# Patient Record
Sex: FEMALE | Race: WHITE | NOT HISPANIC OR LATINO | Employment: OTHER | URBAN - METROPOLITAN AREA
[De-identification: names, ages, dates, MRNs, and addresses within clinical notes are randomized per-mention and may not be internally consistent; named-entity substitution may affect disease eponyms.]

---

## 2019-12-21 ENCOUNTER — HOSPITAL ENCOUNTER (EMERGENCY)
Facility: HOSPITAL | Age: 84
Discharge: HOME | End: 2019-12-21
Attending: EMERGENCY MEDICINE | Admitting: EMERGENCY MEDICINE

## 2019-12-21 ENCOUNTER — APPOINTMENT (OUTPATIENT)
Dept: CT IMAGING | Facility: HOSPITAL | Age: 84
End: 2019-12-21

## 2019-12-21 ENCOUNTER — APPOINTMENT (OUTPATIENT)
Dept: GENERAL RADIOLOGY | Facility: HOSPITAL | Age: 84
End: 2019-12-21

## 2019-12-21 ENCOUNTER — HOSPITAL ENCOUNTER (OUTPATIENT)
Facility: HOSPITAL | Age: 84
Setting detail: OBSERVATION
Discharge: HOME HEALTH | End: 2019-12-23
Attending: EMERGENCY MEDICINE | Admitting: EMERGENCY MEDICINE

## 2019-12-21 VITALS
SYSTOLIC BLOOD PRESSURE: 160 MMHG | OXYGEN SATURATION: 98 % | HEART RATE: 62 BPM | HEIGHT: 62 IN | TEMPERATURE: 97.9 F | WEIGHT: 164.02 LBS | RESPIRATION RATE: 16 BRPM | BODY MASS INDEX: 30.18 KG/M2 | DIASTOLIC BLOOD PRESSURE: 58 MMHG

## 2019-12-21 DIAGNOSIS — I10 ESSENTIAL HYPERTENSION: ICD-10-CM

## 2019-12-21 DIAGNOSIS — R42 VERTIGO: ICD-10-CM

## 2019-12-21 DIAGNOSIS — I10 ESSENTIAL HYPERTENSION: Primary | ICD-10-CM

## 2019-12-21 DIAGNOSIS — R51.9 ACUTE NONINTRACTABLE HEADACHE, UNSPECIFIED HEADACHE TYPE: ICD-10-CM

## 2019-12-21 DIAGNOSIS — G44.89 OTHER HEADACHE SYNDROME: ICD-10-CM

## 2019-12-21 DIAGNOSIS — R11.2 NON-INTRACTABLE VOMITING WITH NAUSEA, UNSPECIFIED VOMITING TYPE: Primary | ICD-10-CM

## 2019-12-21 PROBLEM — R11.10 NON-INTRACTABLE VOMITING: Status: ACTIVE | Noted: 2019-12-21

## 2019-12-21 LAB
ANION GAP SERPL CALCULATED.3IONS-SCNC: 10 MMOL/L (ref 5–15)
BASOPHILS # BLD AUTO: 0.1 10*3/MM3 (ref 0–0.2)
BASOPHILS NFR BLD AUTO: 1.1 % (ref 0–1.5)
BUN BLD-MCNC: 23 MG/DL (ref 8–23)
BUN/CREAT SERPL: 22.8 (ref 7–25)
CALCIUM SPEC-SCNC: 10.7 MG/DL (ref 8.6–10.5)
CHLORIDE SERPL-SCNC: 104 MMOL/L (ref 98–107)
CO2 SERPL-SCNC: 29 MMOL/L (ref 22–29)
CREAT BLD-MCNC: 1.01 MG/DL (ref 0.57–1)
DEPRECATED RDW RBC AUTO: 42.9 FL (ref 37–54)
EOSINOPHIL # BLD AUTO: 0.1 10*3/MM3 (ref 0–0.4)
EOSINOPHIL NFR BLD AUTO: 1.3 % (ref 0.3–6.2)
ERYTHROCYTE [DISTWIDTH] IN BLOOD BY AUTOMATED COUNT: 13.5 % (ref 12.3–15.4)
GFR SERPL CREATININE-BSD FRML MDRD: 52 ML/MIN/1.73
GLUCOSE BLD-MCNC: 110 MG/DL (ref 65–99)
HCT VFR BLD AUTO: 34 % (ref 34–46.6)
HGB BLD-MCNC: 11.8 G/DL (ref 12–15.9)
LYMPHOCYTES # BLD AUTO: 1.9 10*3/MM3 (ref 0.7–3.1)
LYMPHOCYTES NFR BLD AUTO: 20.1 % (ref 19.6–45.3)
MCH RBC QN AUTO: 30.7 PG (ref 26.6–33)
MCHC RBC AUTO-ENTMCNC: 34.6 G/DL (ref 31.5–35.7)
MCV RBC AUTO: 88.8 FL (ref 79–97)
MONOCYTES # BLD AUTO: 0.7 10*3/MM3 (ref 0.1–0.9)
MONOCYTES NFR BLD AUTO: 7.6 % (ref 5–12)
NEUTROPHILS # BLD AUTO: 6.5 10*3/MM3 (ref 1.7–7)
NEUTROPHILS NFR BLD AUTO: 69.9 % (ref 42.7–76)
NRBC BLD AUTO-RTO: 0.1 /100 WBC (ref 0–0.2)
PLATELET # BLD AUTO: 277 10*3/MM3 (ref 140–450)
PMV BLD AUTO: 7.8 FL (ref 6–12)
POTASSIUM BLD-SCNC: 4.3 MMOL/L (ref 3.5–5.2)
RBC # BLD AUTO: 3.83 10*6/MM3 (ref 3.77–5.28)
SODIUM BLD-SCNC: 143 MMOL/L (ref 136–145)
TROPONIN T SERPL-MCNC: <0.01 NG/ML (ref 0–0.03)
WBC NRBC COR # BLD: 9.4 10*3/MM3 (ref 3.4–10.8)

## 2019-12-21 PROCEDURE — G0378 HOSPITAL OBSERVATION PER HR: HCPCS

## 2019-12-21 PROCEDURE — 25010000002 HYDRALAZINE PER 20 MG: Performed by: EMERGENCY MEDICINE

## 2019-12-21 PROCEDURE — 99285 EMERGENCY DEPT VISIT HI MDM: CPT

## 2019-12-21 PROCEDURE — 96375 TX/PRO/DX INJ NEW DRUG ADDON: CPT

## 2019-12-21 PROCEDURE — 96372 THER/PROPH/DIAG INJ SC/IM: CPT

## 2019-12-21 PROCEDURE — 70450 CT HEAD/BRAIN W/O DYE: CPT

## 2019-12-21 PROCEDURE — 25010000002 HYDROMORPHONE PER 4 MG: Performed by: EMERGENCY MEDICINE

## 2019-12-21 PROCEDURE — 93005 ELECTROCARDIOGRAM TRACING: CPT | Performed by: EMERGENCY MEDICINE

## 2019-12-21 PROCEDURE — 25010000002 MORPHINE PER 10 MG: Performed by: EMERGENCY MEDICINE

## 2019-12-21 PROCEDURE — 96374 THER/PROPH/DIAG INJ IV PUSH: CPT

## 2019-12-21 PROCEDURE — 25010000002 DIPHENHYDRAMINE PER 50 MG: Performed by: EMERGENCY MEDICINE

## 2019-12-21 PROCEDURE — 99219 PR INITIAL OBSERVATION CARE/DAY 50 MINUTES: CPT | Performed by: NURSE PRACTITIONER

## 2019-12-21 PROCEDURE — 84484 ASSAY OF TROPONIN QUANT: CPT | Performed by: EMERGENCY MEDICINE

## 2019-12-21 PROCEDURE — 80048 BASIC METABOLIC PNL TOTAL CA: CPT | Performed by: EMERGENCY MEDICINE

## 2019-12-21 PROCEDURE — 25010000002 ONDANSETRON PER 1 MG: Performed by: EMERGENCY MEDICINE

## 2019-12-21 PROCEDURE — 25010000002 ENOXAPARIN PER 10 MG: Performed by: NURSE PRACTITIONER

## 2019-12-21 PROCEDURE — 99284 EMERGENCY DEPT VISIT MOD MDM: CPT

## 2019-12-21 PROCEDURE — 85025 COMPLETE CBC W/AUTO DIFF WBC: CPT | Performed by: EMERGENCY MEDICINE

## 2019-12-21 PROCEDURE — 71045 X-RAY EXAM CHEST 1 VIEW: CPT

## 2019-12-21 RX ORDER — SODIUM CHLORIDE AND POTASSIUM CHLORIDE 150; 900 MG/100ML; MG/100ML
100 INJECTION, SOLUTION INTRAVENOUS CONTINUOUS
Status: DISCONTINUED | OUTPATIENT
Start: 2019-12-21 | End: 2019-12-23 | Stop reason: HOSPADM

## 2019-12-21 RX ORDER — ACETAMINOPHEN 650 MG/1
650 SUPPOSITORY RECTAL EVERY 4 HOURS PRN
Status: DISCONTINUED | OUTPATIENT
Start: 2019-12-21 | End: 2019-12-23 | Stop reason: HOSPADM

## 2019-12-21 RX ORDER — HYDRALAZINE HYDROCHLORIDE 20 MG/ML
10 INJECTION INTRAMUSCULAR; INTRAVENOUS ONCE
Status: COMPLETED | OUTPATIENT
Start: 2019-12-21 | End: 2019-12-21

## 2019-12-21 RX ORDER — ACETAMINOPHEN 160 MG/5ML
650 SOLUTION ORAL EVERY 4 HOURS PRN
Status: DISCONTINUED | OUTPATIENT
Start: 2019-12-21 | End: 2019-12-23 | Stop reason: HOSPADM

## 2019-12-21 RX ORDER — HYDROMORPHONE HCL 110MG/55ML
0.5 PATIENT CONTROLLED ANALGESIA SYRINGE INTRAVENOUS ONCE
Status: COMPLETED | OUTPATIENT
Start: 2019-12-21 | End: 2019-12-21

## 2019-12-21 RX ORDER — LANOLIN ALCOHOL/MO/W.PET/CERES
1000 CREAM (GRAM) TOPICAL DAILY
COMMUNITY

## 2019-12-21 RX ORDER — ONDANSETRON 4 MG/1
4 TABLET, FILM COATED ORAL EVERY 6 HOURS PRN
Status: DISCONTINUED | OUTPATIENT
Start: 2019-12-21 | End: 2019-12-23 | Stop reason: HOSPADM

## 2019-12-21 RX ORDER — ONDANSETRON 2 MG/ML
4 INJECTION INTRAMUSCULAR; INTRAVENOUS EVERY 6 HOURS PRN
Status: DISCONTINUED | OUTPATIENT
Start: 2019-12-21 | End: 2019-12-23 | Stop reason: HOSPADM

## 2019-12-21 RX ORDER — MULTIPLE VITAMINS W/ MINERALS TAB 9MG-400MCG
1 TAB ORAL DAILY
COMMUNITY

## 2019-12-21 RX ORDER — METOPROLOL TARTRATE 100 MG/1
100 TABLET ORAL DAILY
COMMUNITY
End: 2019-12-21

## 2019-12-21 RX ORDER — MECLIZINE HYDROCHLORIDE 25 MG/1
25 TABLET ORAL 4 TIMES DAILY PRN
Qty: 16 TABLET | Refills: 0 | Status: SHIPPED | OUTPATIENT
Start: 2019-12-21 | End: 2020-01-06

## 2019-12-21 RX ORDER — ONDANSETRON 2 MG/ML
4 INJECTION INTRAMUSCULAR; INTRAVENOUS ONCE
Status: COMPLETED | OUTPATIENT
Start: 2019-12-21 | End: 2019-12-21

## 2019-12-21 RX ORDER — ACETAMINOPHEN 325 MG/1
650 TABLET ORAL EVERY 4 HOURS PRN
Status: DISCONTINUED | OUTPATIENT
Start: 2019-12-21 | End: 2019-12-23 | Stop reason: HOSPADM

## 2019-12-21 RX ORDER — MECLIZINE HYDROCHLORIDE 25 MG/1
25 TABLET ORAL ONCE
Status: COMPLETED | OUTPATIENT
Start: 2019-12-21 | End: 2019-12-21

## 2019-12-21 RX ORDER — HYDRALAZINE HYDROCHLORIDE 25 MG/1
25 TABLET, FILM COATED ORAL 2 TIMES DAILY
COMMUNITY

## 2019-12-21 RX ORDER — DIPHENHYDRAMINE HYDROCHLORIDE 50 MG/ML
25 INJECTION INTRAMUSCULAR; INTRAVENOUS ONCE
Status: COMPLETED | OUTPATIENT
Start: 2019-12-21 | End: 2019-12-21

## 2019-12-21 RX ORDER — SODIUM CHLORIDE 0.9 % (FLUSH) 0.9 %
10 SYRINGE (ML) INJECTION AS NEEDED
Status: DISCONTINUED | OUTPATIENT
Start: 2019-12-21 | End: 2019-12-23 | Stop reason: HOSPADM

## 2019-12-21 RX ORDER — MORPHINE SULFATE 4 MG/ML
4 INJECTION, SOLUTION INTRAMUSCULAR; INTRAVENOUS ONCE
Status: COMPLETED | OUTPATIENT
Start: 2019-12-21 | End: 2019-12-21

## 2019-12-21 RX ORDER — SODIUM CHLORIDE 0.9 % (FLUSH) 0.9 %
10 SYRINGE (ML) INJECTION AS NEEDED
Status: DISCONTINUED | OUTPATIENT
Start: 2019-12-21 | End: 2019-12-21 | Stop reason: HOSPADM

## 2019-12-21 RX ORDER — SODIUM CHLORIDE 0.9 % (FLUSH) 0.9 %
10 SYRINGE (ML) INJECTION EVERY 12 HOURS SCHEDULED
Status: DISCONTINUED | OUTPATIENT
Start: 2019-12-21 | End: 2019-12-23 | Stop reason: HOSPADM

## 2019-12-21 RX ORDER — FUROSEMIDE 20 MG/1
20 TABLET ORAL DAILY
COMMUNITY

## 2019-12-21 RX ADMIN — ONDANSETRON 4 MG: 2 INJECTION INTRAMUSCULAR; INTRAVENOUS at 13:27

## 2019-12-21 RX ADMIN — SODIUM CHLORIDE 500 ML: 900 INJECTION, SOLUTION INTRAVENOUS at 16:31

## 2019-12-21 RX ADMIN — HYDROMORPHONE HYDROCHLORIDE 0.5 MG: 2 INJECTION, SOLUTION INTRAMUSCULAR; INTRAVENOUS; SUBCUTANEOUS at 13:33

## 2019-12-21 RX ADMIN — HYDRALAZINE HYDROCHLORIDE 10 MG: 20 INJECTION INTRAMUSCULAR; INTRAVENOUS at 13:30

## 2019-12-21 RX ADMIN — Medication 10 ML: at 23:27

## 2019-12-21 RX ADMIN — ENOXAPARIN SODIUM 40 MG: 40 INJECTION SUBCUTANEOUS at 23:27

## 2019-12-21 RX ADMIN — MORPHINE SULFATE 4 MG: 4 INJECTION INTRAVENOUS at 14:43

## 2019-12-21 RX ADMIN — MECLIZINE HYDROCHLORIDE 25 MG: 25 TABLET ORAL at 16:05

## 2019-12-21 RX ADMIN — DIPHENHYDRAMINE HYDROCHLORIDE 25 MG: 50 INJECTION, SOLUTION INTRAMUSCULAR; INTRAVENOUS at 13:29

## 2019-12-21 NOTE — ED NOTES
Pt reports hypertension higher than normal.  She has history of hypertension and reports taking blood pressure medication daily.  Her Dr. Changed her medication this week.     Eliz Galan, RN  12/21/19 9311

## 2019-12-21 NOTE — DISCHARGE INSTRUCTIONS
Continue current medications and also start taking your hydralazine.  Follow-up with Dr. Zelaya in the coming week for a blood pressure recheck.  Meclizine for dizziness.

## 2019-12-21 NOTE — ED NOTES
Pt reports no dizziness.  She is able to comfortably sit and stand on her own.     Eliz Galan, RN  12/21/19 8868

## 2019-12-21 NOTE — ED PROVIDER NOTES
Subjective   History of Present Illness  Patient with a history of hypertension has been on metoprolol and Lasix for some time.  She complains of increasing blood pressure recently along with a headache as well as lightheadedness and states that she also has pain in the lower back which is chronic.  The patient denies any nausea vomiting change in vision or chest pain.  Her doctor added hydralazine yesterday but she states that she was afraid to take it and noticed that her blood pressure was over 200 this morning and came to the emergency department.  The patient denies any fever chills cough or congestion vomiting or diarrhea.  Review of Systems   Constitutional: Positive for fatigue.   HENT: Negative.    Eyes: Negative.    Respiratory: Negative.    Cardiovascular: Positive for leg swelling.   Gastrointestinal: Positive for nausea.   Endocrine: Negative.    Genitourinary: Negative.    Musculoskeletal: Positive for back pain.   Skin: Negative.    Allergic/Immunologic: Negative.    Neurological: Positive for weakness and light-headedness.   Hematological: Negative.    Psychiatric/Behavioral: Negative.        No past medical history on file.    Allergies   Allergen Reactions   • Codeine Hives       No past surgical history on file.    No family history on file.    Social History     Socioeconomic History   • Marital status:      Spouse name: Not on file   • Number of children: Not on file   • Years of education: Not on file   • Highest education level: Not on file           Objective   Physical Exam  Patient is awake and alert her blood pressure was elevated to 20/77 and heart rate was 91.  The HEENT exam shows no evidence of any trauma or tenderness pupils equal round reactive to light EOMs are full ENT is unremarkable neck is supple without meningeal signs her chest is clear cardiovascular exam reveals a regular rhythm without a gallop or murmur the abdomen is soft and nontender the patient has trace pedal  edema she has good range of motion of the extremities no focal deficit was noted.  Procedures           ED Course            Results for orders placed or performed during the hospital encounter of 12/21/19   Basic Metabolic Panel   Result Value Ref Range    Glucose 110 (H) 65 - 99 mg/dL    BUN 23 8 - 23 mg/dL    Creatinine 1.01 (H) 0.57 - 1.00 mg/dL    Sodium 143 136 - 145 mmol/L    Potassium 4.3 3.5 - 5.2 mmol/L    Chloride 104 98 - 107 mmol/L    CO2 29.0 22.0 - 29.0 mmol/L    Calcium 10.7 (H) 8.6 - 10.5 mg/dL    eGFR Non African Amer 52 (L) >60 mL/min/1.73    BUN/Creatinine Ratio 22.8 7.0 - 25.0    Anion Gap 10.0 5.0 - 15.0 mmol/L   Troponin   Result Value Ref Range    Troponin T <0.010 0.000 - 0.030 ng/mL   CBC Auto Differential   Result Value Ref Range    WBC 9.40 3.40 - 10.80 10*3/mm3    RBC 3.83 3.77 - 5.28 10*6/mm3    Hemoglobin 11.8 (L) 12.0 - 15.9 g/dL    Hematocrit 34.0 34.0 - 46.6 %    MCV 88.8 79.0 - 97.0 fL    MCH 30.7 26.6 - 33.0 pg    MCHC 34.6 31.5 - 35.7 g/dL    RDW 13.5 12.3 - 15.4 %    RDW-SD 42.9 37.0 - 54.0 fl    MPV 7.8 6.0 - 12.0 fL    Platelets 277 140 - 450 10*3/mm3    Neutrophil % 69.9 42.7 - 76.0 %    Lymphocyte % 20.1 19.6 - 45.3 %    Monocyte % 7.6 5.0 - 12.0 %    Eosinophil % 1.3 0.3 - 6.2 %    Basophil % 1.1 0.0 - 1.5 %    Neutrophils, Absolute 6.50 1.70 - 7.00 10*3/mm3    Lymphocytes, Absolute 1.90 0.70 - 3.10 10*3/mm3    Monocytes, Absolute 0.70 0.10 - 0.90 10*3/mm3    Eosinophils, Absolute 0.10 0.00 - 0.40 10*3/mm3    Basophils, Absolute 0.10 0.00 - 0.20 10*3/mm3    nRBC 0.1 0.0 - 0.2 /100 WBC     Medications   sodium chloride 0.9 % flush 10 mL (has no administration in time range)   HYDROmorphone (DILAUDID) injection 0.5 mg (0.5 mg Intravenous Given 12/21/19 1333)   ondansetron (ZOFRAN) injection 4 mg (4 mg Intravenous Given 12/21/19 1327)   hydrALAZINE (APRESOLINE) injection 10 mg (10 mg Intravenous Given 12/21/19 1330)   diphenhydrAMINE (BENADRYL) injection 25 mg (25 mg  Intravenous Given 12/21/19 1329)   Morphine sulfate (PF) injection 4 mg (4 mg Intravenous Given 12/21/19 1443)     Ct Head Without Contrast    Result Date: 12/21/2019  No evidence of hemorrhage, mass effect or midline shift. No acute process identified.  Electronically Signed By-Kaylyn Helton On:12/21/2019 2:42 PM This report was finalized on [id number] by  Kaylyn Helton, .    Xr Chest 1 View    Result Date: 12/21/2019  Cardiomegaly without acute cardiopulmonary abnormality.  Electronically Signed By-Feng Paige On:12/21/2019 3:22 PM This report was finalized on [id number] by  Feng Paige, .                 No data recorded                        MDM  The patient's blood pressure decreased to an acceptable range after IV hydralazine.  The patient had resolution of her headache also.  The patient has a prescription for hydralazine that she has not started and she is encouraged to start taking that tonight.  Final diagnoses:   Essential hypertension   Other headache syndrome              Manuel Varela MD  12/21/19 4902

## 2019-12-22 PROCEDURE — 25010000002 ENOXAPARIN PER 10 MG: Performed by: NURSE PRACTITIONER

## 2019-12-22 PROCEDURE — 96361 HYDRATE IV INFUSION ADD-ON: CPT

## 2019-12-22 PROCEDURE — 99225 PR SBSQ OBSERVATION CARE/DAY 25 MINUTES: CPT | Performed by: INTERNAL MEDICINE

## 2019-12-22 PROCEDURE — 25810000003 SODIUM CHLORIDE 0.9 % WITH KCL 20 MEQ 20-0.9 MEQ/L-% SOLUTION: Performed by: NURSE PRACTITIONER

## 2019-12-22 PROCEDURE — G0378 HOSPITAL OBSERVATION PER HR: HCPCS

## 2019-12-22 PROCEDURE — 96372 THER/PROPH/DIAG INJ SC/IM: CPT

## 2019-12-22 RX ADMIN — SODIUM CHLORIDE AND POTASSIUM CHLORIDE 100 ML/HR: 9; 1.49 INJECTION, SOLUTION INTRAVENOUS at 09:42

## 2019-12-22 RX ADMIN — Medication 10 ML: at 08:19

## 2019-12-22 RX ADMIN — SODIUM CHLORIDE AND POTASSIUM CHLORIDE 100 ML/HR: 9; 1.49 INJECTION, SOLUTION INTRAVENOUS at 19:17

## 2019-12-22 RX ADMIN — SODIUM CHLORIDE AND POTASSIUM CHLORIDE 100 ML/HR: 9; 1.49 INJECTION, SOLUTION INTRAVENOUS at 00:06

## 2019-12-22 RX ADMIN — ENOXAPARIN SODIUM 40 MG: 40 INJECTION SUBCUTANEOUS at 18:01

## 2019-12-22 NOTE — PLAN OF CARE
Problem: Patient Care Overview  Goal: Plan of Care Review  12/22/2019 0436 by Treva Bah LPN  Outcome: Ongoing (interventions implemented as appropriate)  12/22/2019 0436 by Treva Bah LPN  Outcome: Ongoing (interventions implemented as appropriate)  Flowsheets (Taken 12/22/2019 0436)  Plan of Care Reviewed With: patient

## 2019-12-22 NOTE — ED PROVIDER NOTES
Subjective   History of Present Illness  85-year-old female with a history of hypertension is normally on metoprolol but her blood pressure has been elevated recently.  She saw her doctor 2 days ago who prescribed her hydralazine but she read the side effects and did not want to take it.  Her blood pressure was elevated today and she felt dizzy.  Patient had a headache.  Patient denies any vomiting.  Denies any chest pain shortness of breath numbness or tingling.  The patient was just seen in the emergency department about 1/2-hour ago for hypertension was treated with hydralazine and her pressure was under control.  She also complained of some dizziness and was given some meclizine.  She states that she went home and tried to take her medicine and immediately vomited and return to the ER.  Review of Systems   Constitutional: Positive for fatigue.   Respiratory: Negative.    Cardiovascular: Negative.    Gastrointestinal: Positive for nausea and vomiting.   Endocrine: Negative.    Genitourinary: Negative.    Musculoskeletal: Positive for myalgias.   Skin: Negative.    Allergic/Immunologic: Negative.    Neurological: Positive for dizziness and headaches.   Hematological: Negative.    Psychiatric/Behavioral: Negative.        Past Medical History:   Diagnosis Date   • Hypertension        Allergies   Allergen Reactions   • Codeine Hives       Past Surgical History:   Procedure Laterality Date   • HYSTERECTOMY     • REPLACEMENT TOTAL HIP LATERAL POSITION         History reviewed. No pertinent family history.    Social History     Socioeconomic History   • Marital status:      Spouse name: Not on file   • Number of children: Not on file   • Years of education: Not on file   • Highest education level: Not on file   Tobacco Use   • Smoking status: Never Smoker   • Smokeless tobacco: Never Used   Substance and Sexual Activity   • Alcohol use: Never     Frequency: Never   • Drug use: Never           Objective   Physical  Exam  Patient is awake and alert the blood pressure was 159/65 the temp was 97.6 and a heart rate was 67.  The HEENT exam shows no evidence of any trauma or tenderness pupils equal round reactive to light EOMs are full there is no nystagmus ENT is clear neck is supple carotids 2+ no bruits are heard chest is clear cardiovascular exam reveals a regular rhythm her abdomen was soft she has no cyanosis clubbing or edema neurologic exam reveals her to be vertiginous but I do not appreciate any nystagmus associated with that.  Procedures           ED Course                      No data recorded                        MDM  The patient had lab work reviewed and there was no significant abnormality.  Her blood pressure was stable.  The patient had significant vertigo as well as nausea.  She had an IV begun and was given Zofran for nausea and will be admitted overnight for IV fluids as well as anti-medics as well as blood pressure control as indicated.  Final diagnoses:   Non-intractable vomiting with nausea, unspecified vomiting type   Essential hypertension   Vertigo   Acute nonintractable headache, unspecified headache type              Manuel Varela MD  12/21/19 2036     Yes

## 2019-12-22 NOTE — PLAN OF CARE
Problem: Patient Care Overview  Goal: Plan of Care Review  Outcome: Ongoing (interventions implemented as appropriate)  Flowsheets (Taken 12/22/2019 3677)  Plan of Care Reviewed With: patient

## 2019-12-22 NOTE — ED NOTES
Pt states that she was seen earlier today for dizziness. States that she was discharged and has been unable to take the medications due to nausea.     Sugar Garcia RN  12/21/19 1957

## 2019-12-22 NOTE — PLAN OF CARE
Problem: Patient Care Overview  Goal: Plan of Care Review  12/22/2019 0437 by Treva Bah LPN  Outcome: Ongoing (interventions implemented as appropriate)  Flowsheets (Taken 12/22/2019 0437)  Plan of Care Reviewed With: patient  12/22/2019 0436 by Treva Bah LPN  Outcome: Ongoing (interventions implemented as appropriate)  12/22/2019 0436 by Treva Bah LPN  Outcome: Ongoing (interventions implemented as appropriate)  Flowsheets (Taken 12/22/2019 0436)  Plan of Care Reviewed With: patient

## 2019-12-22 NOTE — H&P
[facility] Medicine Services      Patient Name: Madison Khan  : 1934  MRN: [id number]  Primary Care Physician: Darryn Giron MD  Date of admission: 2019    Patient Care Team:  Darryn Giron MD as PCP - General          Subjective   History Present Illness     Chief Complaint:   Chief Complaint   Patient presents with   • Dizziness       Ms. Khan is a 85 y.o.  presents to [facility] complaining of nausea and vomiting.         85-year-old female presents to the ER after having an episode of significant nausea and vomiting when returning home after being seen in the emergency room for hypertension.  In the emergency room the patient had been given hydralazine IV for elevated blood pressure.  She had seen her PCP earlier in the week with noted elevated blood pressure and was prescribed p.o. hydralazine.  She had not taken the medication because of concern for side effects.  Patient has long standing hypertension which is been well controlled on Lasix until just recently.  The patient denies recent subjective fever or chills.      Review of Systems   Constitution: Negative for chills and fever.   Cardiovascular: Negative for chest pain.   Gastrointestinal: Positive for nausea and vomiting.   All other systems reviewed and are negative.          Personal History     Past Medical History:   Past Medical History:   Diagnosis Date   • Hypertension        Surgical History:      Past Surgical History:   Procedure Laterality Date   • HYSTERECTOMY     • REPLACEMENT TOTAL HIP LATERAL POSITION             Family History: family history is not on file. Otherwise pertinent FHx was reviewed and unremarkable.     Social History:  reports that she has never smoked. She has never used smokeless tobacco. She reports that she does not drink alcohol or use drugs.      Medications:  Prior to Admission medications    Medication Sig Start Date End Date Taking? Authorizing  Provider   Calcium-Magnesium-Vitamin D (CALCIUM 500 PO) Take 500 mg by mouth Daily.   Yes Ryan Delatorre MD   furosemide (LASIX) 20 MG tablet Take 20 mg by mouth Daily.   Yes Ryan Delatorre MD   hydrALAZINE (APRESOLINE) 25 MG tablet Take 25 mg by mouth 2 (Two) Times a Day.   Yes Ryan Delatorre MD   meclizine (ANTIVERT) 25 MG tablet Take 1 tablet by mouth 4 (Four) Times a Day As Needed for Dizziness for up to 16 days. 12/21/19 1/6/20 Yes Manuel Varela MD   Multiple Vitamins-Minerals (MULTIVITAMIN WITH MINERALS) tablet tablet Take 1 tablet by mouth Daily.   Yes Ryan Delatorre MD   vitamin B-12 (CYANOCOBALAMIN) 1000 MCG tablet Take 1,000 mcg by mouth Daily.   Yes Ryan Delatorre MD   metoprolol tartrate (LOPRESSOR) 100 MG tablet Take 100 mg by mouth Daily.  12/21/19  Ryan Delatorre MD       Allergies:    Allergies   Allergen Reactions   • Codeine Hives       Objective   Objective     Vital Signs  Temp:  [97.6 °F (36.4 °C)-97.9 °F (36.6 °C)] 97.6 °F (36.4 °C)  Heart Rate:  [56-91] 57  Resp:  [14-16] 16  BP: (138-220)/() 144/50  SpO2:  [95 %-99 %] 97 %  on   ;   Device (Oxygen Therapy): room air  Body mass index is 30 kg/m².    Physical Exam   Constitutional: She is oriented to person, place, and time. She appears well-developed and well-nourished. No distress.   HENT:   Head: Normocephalic and atraumatic.   Eyes: Pupils are equal, round, and reactive to light. EOM are normal. No scleral icterus.   Neck: Normal range of motion. Neck supple. No JVD present. No tracheal deviation present. No thyromegaly present.   Cardiovascular: Normal rate, regular rhythm, normal heart sounds and intact distal pulses.   No murmur heard.  Pulmonary/Chest: Effort normal and breath sounds normal.   Abdominal: Soft. Bowel sounds are normal. She exhibits no distension.   Musculoskeletal: Normal range of motion. She exhibits no edema or tenderness.   Lymphadenopathy:     She has no  cervical adenopathy.   Neurological: She is alert and oriented to person, place, and time.   Skin: Skin is warm and dry. Capillary refill takes less than 2 seconds. She is not diaphoretic.   Psychiatric: She has a normal mood and affect. Her behavior is normal. Judgment and thought content normal.   Vitals reviewed.      Results Review:  I have personally reviewed most recent cardiac tracings, lab results and radiology images and interpretations and agree with findings, most notably: Mild renal injury likely hydration.    Results from last 7 days   Lab Units 12/21/19  1316   WBC 10*3/mm3 9.40   HEMOGLOBIN g/dL 11.8*   HEMATOCRIT % 34.0   PLATELETS 10*3/mm3 277     Results from last 7 days   Lab Units 12/21/19  1316   SODIUM mmol/L 143   POTASSIUM mmol/L 4.3   CHLORIDE mmol/L 104   CO2 mmol/L 29.0   BUN mg/dL 23   CREATININE mg/dL 1.01*   GLUCOSE mg/dL 110*   CALCIUM mg/dL 10.7*   TROPONIN T ng/mL <0.010     Estimated Creatinine Clearance: 38.4 mL/min (A) (by C-G formula based on SCr of 1.01 mg/dL (H)).  Brief Urine Lab Results     None          Microbiology Results (last 10 days)     ** No results found for the last 240 hours. **        EKG dated 12/21/2019 showing mild sinus bradycardia, rate 59 with incomplete left bundle branch block  ECG/EMG Results (most recent)     None            Ct Head Without Contrast    Result Date: 12/21/2019  No evidence of hemorrhage, mass effect or midline shift. No acute process identified.  Electronically Signed By-Kaylyn Helton On:12/21/2019 2:42 PM This report was finalized on [id number] by  Kaylyn Helton, .    Xr Chest 1 View    Result Date: 12/21/2019  Cardiomegaly without acute cardiopulmonary abnormality.  Electronically Signed By-Feng Paige On:12/21/2019 3:22 PM This report was finalized on [id number] by  Feng Paige, .        Estimated Creatinine Clearance: 38.4 mL/min (A) (by C-G formula based on SCr of 1.01 mg/dL (H)).    Assessment/Plan   Assessment/Plan        Active Hospital Problems    Diagnosis  POA   • Non-intractable vomiting [R11.10]  Yes      Resolved Hospital Problems   No resolved problems to display.       Active Hospital Problems:  No notes have been filed under this hospital service.  Service: Hospitalist    Nausea and vomiting, with dizziness uncertain etiology: Antiemetics as needed, continue meclizine    --Consideration for chronic hypertension with decreased tolerance for normal blood pressure    --Consideration for unobserved hypotension secondary to change in blood pressure medication    Hypertension, chronic, uncontrolled  Plan: Continue hydralazine; hold Lasix secondary to vomiting    Dietary supplementation: Continue vitamin D, calcium, multivitamin    VTE Prophylaxis - Lovenox 30 mg SC daily.    CODE STATUS:    Code Status and Medical Interventions:   Ordered at: 12/21/19 2149     Code Status:    CPR     Medical Interventions (Level of Support Prior to Arrest):    Full       Admission Status:  I believe this patient meets observation criteria.      I discussed the patients findings and my recommendations with patient.        Electronically signed by TARA Cheema, 12/21/19, 9:51 PM.  [facility] Hospitalist Team

## 2019-12-22 NOTE — PROGRESS NOTES
Hospitalist Team      Patient Care Team:  Darryn Giron MD as PCP - General        Chief Complaint: Nausea and vomiting with weakness    Subjective  The patient is an 85-year-old female who presents with a chief complaint of nausea and vomiting with dizziness.  The patient has not had any nausea or vomiting since admission, however she was just admitted yesterday evening.  The patient is currently on a clear liquid diet and seems to be tolerating that fairly well.  The patient reports that she is having issues being able to walk independently.  He reports she is very dependent upon nursing to assist her with ambulation.  Interval History and ROS:     Review of Systems   Constitutional: Negative for activity change, appetite change, fatigue and fever.   HENT: Negative for congestion, nosebleeds, postnasal drip and voice change.    Eyes: Negative for photophobia and discharge.   Respiratory: Negative for cough, choking, chest tightness, shortness of breath and wheezing.    Cardiovascular: Negative for chest pain and leg swelling.   Gastrointestinal: Positive for nausea. Negative for abdominal distention, abdominal pain, constipation and diarrhea.   Genitourinary: Negative for decreased urine volume, difficulty urinating, dysuria, frequency and urgency.   Musculoskeletal: Positive for arthralgias and myalgias.   Skin: Negative for rash.   Neurological: Positive for dizziness. Negative for syncope, facial asymmetry, light-headedness and headaches.   Psychiatric/Behavioral: Negative for agitation, behavioral problems, confusion and decreased concentration. The patient is not nervous/anxious.    All other systems reviewed and are negative.      Objective    Vital Signs  Temp:  [97.6 °F (36.4 °C)-98 °F (36.7 °C)] 97.8 °F (36.6 °C)  Heart Rate:  [51-73] 73  Resp:  [15-16] 15  BP: (131-193)/(33-71) 133/62  Oxygen Therapy  SpO2: 94 %  Pulse Oximetry Type: Intermittent  Device (Oxygen Therapy): room air  Flowsheet Rows      " First Filed Value   Admission Height  157.5 cm (62\") Documented at 12/21/2019 1926   Admission Weight  74.4 kg (164 lb 0.4 oz) Documented at 12/21/2019 1926        Intake & Output (last 3 days)       12/19 0701 - 12/20 0700 12/20 0701 - 12/21 0700 12/21 0701 - 12/22 0700 12/22 0701 - 12/23 0700    P.O.    400    Total Intake(mL/kg)    400 (5.3)    Urine (mL/kg/hr)   250 200 (0.3)    Total Output   250 200    Net   -250 +200                Lines, Drains & Airways    Active LDAs     Name:   Placement date:   Placement time:   Site:   Days:    Peripheral IV 12/21/19 2030 Left Antecubital   12/21/19 2030    Antecubital   less than 1                Physical Exam:    General Appearance:    Alert, cooperative, in no acute distress   Head:    Normocephalic, without obvious abnormality, atraumatic   Eyes:            Lids and lashes normal, conjunctivae and sclerae normal, no   icterus, no pallor, corneas clear, PERRLA   Ears:    Ears appear intact with no abnormalities noted   Throat:   No oral lesions, no thrush, oral mucosa moist   Neck:   No adenopathy, supple, trachea midline, no thyromegaly, no     carotid bruit, no JVD   Back:     No kyphosis present, no scoliosis present, no skin lesions,       erythema or scars, no tenderness to percussion or                   palpation,   range of motion normal   Lungs:     Clear to auscultation,respirations regular, even and                   unlabored    Heart:    Regular rhythm and normal rate, normal S1 and S2, no            murmur, no gallop, no rub, no click   Breast Exam:    Deferred   Abdomen:     Normal bowel sounds, no masses, no organomegaly, soft        non-tender, non-distended, no guarding, no rebound                 tenderness   Genitalia:    Deferred   Extremities:   Moves all extremities well, no edema, no cyanosis, no              redness   Pulses:   Pulses palpable and equal bilaterally   Skin:   No bleeding, bruising or rash   Lymph nodes:   No palpable " adenopathy   Neurologic:  The patient is alert oriented and appropriate.  Her gait was not appraised.       Results Review:     I reviewed the patient's new clinical results.    Lab Results (last 24 hours)     ** No results found for the last 24 hours. **          Imaging Results (Last 24 Hours)     ** No results found for the last 24 hours. **          Xrays, labs reviewed personally by physician.    ECG/EMG Results (most recent)     None          Medication Review:   I have reviewed the patient's current medication list    Current Facility-Administered Medications:   •  acetaminophen (TYLENOL) tablet 650 mg, 650 mg, Oral, Q4H PRN **OR** acetaminophen (TYLENOL) 160 MG/5ML solution 650 mg, 650 mg, Oral, Q4H PRN **OR** acetaminophen (TYLENOL) suppository 650 mg, 650 mg, Rectal, Q4H PRN, Nikki Keenan FNP  •  enoxaparin (LOVENOX) syringe 40 mg, 40 mg, Subcutaneous, Q24H, Nikki Keenan FNP, 40 mg at 12/21/19 2327  •  ondansetron (ZOFRAN) tablet 4 mg, 4 mg, Oral, Q6H PRN **OR** ondansetron (ZOFRAN) injection 4 mg, 4 mg, Intravenous, Q6H PRN, Nikki Keenan FNP  •  sodium chloride 0.9 % flush 10 mL, 10 mL, Intravenous, Q12H, Nikki Keenan FNP, 10 mL at 12/22/19 0819  •  sodium chloride 0.9 % flush 10 mL, 10 mL, Intravenous, PRN, Nikki Keenan FNP  •  sodium chloride 0.9 % with KCl 20 mEq/L infusion, 100 mL/hr, Intravenous, Continuous, Nikki Keenan FNP, Last Rate: 100 mL/hr at 12/22/19 0942, 100 mL/hr at 12/22/19 0942      Assessment/Plan   1.  Hypertension-the patient's blood pressures been normal throughout her course.  Currently she is on neither hydralazine nor metoprolol.  We will continue to follow and treat as values dictate.  2.  Nausea vomiting-the patient appears to be doing better.  We will advance her diet as tolerated.  Will decrease her IV fluids to 50 mL an hour in an attempt to avoid volume overload.  The patient has not had any nausea or vomiting since she has been admitted.  3.  Deconditioning-the patient  reports that she needs help from nursing to get up to go to the bathroom.  Will ask physical therapy to evaluate and make recommendations as to what the patient will need at the time of discharge.    Plan for disposition: To be determined    Yoanna Santacruz MD  12/22/19  4:00 PM

## 2019-12-23 VITALS
HEART RATE: 74 BPM | HEIGHT: 62 IN | DIASTOLIC BLOOD PRESSURE: 79 MMHG | TEMPERATURE: 97.6 F | RESPIRATION RATE: 15 BRPM | OXYGEN SATURATION: 97 % | WEIGHT: 164 LBS | SYSTOLIC BLOOD PRESSURE: 189 MMHG | BODY MASS INDEX: 30.18 KG/M2

## 2019-12-23 PROBLEM — R11.10 NON-INTRACTABLE VOMITING: Status: RESOLVED | Noted: 2019-12-21 | Resolved: 2019-12-23

## 2019-12-23 PROBLEM — R51.9 ACUTE NONINTRACTABLE HEADACHE: Status: RESOLVED | Noted: 2019-12-23 | Resolved: 2019-12-23

## 2019-12-23 PROBLEM — I10 ESSENTIAL HYPERTENSION: Status: ACTIVE | Noted: 2019-12-23

## 2019-12-23 PROBLEM — R42 VERTIGO: Status: RESOLVED | Noted: 2019-12-23 | Resolved: 2019-12-23

## 2019-12-23 PROBLEM — R42 VERTIGO: Status: ACTIVE | Noted: 2019-12-23

## 2019-12-23 PROBLEM — R51.9 ACUTE NONINTRACTABLE HEADACHE: Status: ACTIVE | Noted: 2019-12-23

## 2019-12-23 LAB
ANION GAP SERPL CALCULATED.3IONS-SCNC: 6 MMOL/L (ref 5–15)
BASOPHILS # BLD AUTO: 0 10*3/MM3 (ref 0–0.2)
BASOPHILS NFR BLD AUTO: 0.6 % (ref 0–1.5)
BUN BLD-MCNC: 18 MG/DL (ref 8–23)
BUN/CREAT SERPL: 18.6 (ref 7–25)
CALCIUM SPEC-SCNC: 8.3 MG/DL (ref 8.6–10.5)
CHLORIDE SERPL-SCNC: 111 MMOL/L (ref 98–107)
CO2 SERPL-SCNC: 25 MMOL/L (ref 22–29)
CREAT BLD-MCNC: 0.97 MG/DL (ref 0.57–1)
DEPRECATED RDW RBC AUTO: 44.6 FL (ref 37–54)
EOSINOPHIL # BLD AUTO: 0.2 10*3/MM3 (ref 0–0.4)
EOSINOPHIL NFR BLD AUTO: 2.8 % (ref 0.3–6.2)
ERYTHROCYTE [DISTWIDTH] IN BLOOD BY AUTOMATED COUNT: 13.9 % (ref 12.3–15.4)
GFR SERPL CREATININE-BSD FRML MDRD: 55 ML/MIN/1.73
GLUCOSE BLD-MCNC: 87 MG/DL (ref 65–99)
HCT VFR BLD AUTO: 30.7 % (ref 34–46.6)
HGB BLD-MCNC: 10.4 G/DL (ref 12–15.9)
LYMPHOCYTES # BLD AUTO: 2 10*3/MM3 (ref 0.7–3.1)
LYMPHOCYTES NFR BLD AUTO: 29.7 % (ref 19.6–45.3)
MCH RBC QN AUTO: 30.8 PG (ref 26.6–33)
MCHC RBC AUTO-ENTMCNC: 33.9 G/DL (ref 31.5–35.7)
MCV RBC AUTO: 90.8 FL (ref 79–97)
MONOCYTES # BLD AUTO: 0.6 10*3/MM3 (ref 0.1–0.9)
MONOCYTES NFR BLD AUTO: 8.6 % (ref 5–12)
NEUTROPHILS # BLD AUTO: 3.9 10*3/MM3 (ref 1.7–7)
NEUTROPHILS NFR BLD AUTO: 58.3 % (ref 42.7–76)
NRBC BLD AUTO-RTO: 0.1 /100 WBC (ref 0–0.2)
PLATELET # BLD AUTO: 243 10*3/MM3 (ref 140–450)
PMV BLD AUTO: 8.4 FL (ref 6–12)
POTASSIUM BLD-SCNC: 4.4 MMOL/L (ref 3.5–5.2)
RBC # BLD AUTO: 3.38 10*6/MM3 (ref 3.77–5.28)
SODIUM BLD-SCNC: 142 MMOL/L (ref 136–145)
WBC NRBC COR # BLD: 6.6 10*3/MM3 (ref 3.4–10.8)

## 2019-12-23 PROCEDURE — 85025 COMPLETE CBC W/AUTO DIFF WBC: CPT | Performed by: INTERNAL MEDICINE

## 2019-12-23 PROCEDURE — 97162 PT EVAL MOD COMPLEX 30 MIN: CPT

## 2019-12-23 PROCEDURE — G0378 HOSPITAL OBSERVATION PER HR: HCPCS

## 2019-12-23 PROCEDURE — 80048 BASIC METABOLIC PNL TOTAL CA: CPT | Performed by: INTERNAL MEDICINE

## 2019-12-23 PROCEDURE — 99217 PR OBSERVATION CARE DISCHARGE MANAGEMENT: CPT | Performed by: INTERNAL MEDICINE

## 2019-12-23 PROCEDURE — 96361 HYDRATE IV INFUSION ADD-ON: CPT

## 2019-12-23 NOTE — THERAPY EVALUATION
"Patient Name: Madison Khan  : 1934    MRN: [id number]                              Today's Date: 2019       Admit Date: 2019    Visit Dx:     ICD-10-CM ICD-9-CM   1. Non-intractable vomiting with nausea, unspecified vomiting type R11.2 787.01   2. Essential hypertension I10 401.9   3. Vertigo R42 780.4   4. Acute nonintractable headache, unspecified headache type R51 784.0     Patient Active Problem List   Diagnosis   • Non-intractable vomiting     Past Medical History:   Diagnosis Date   • Hypertension      Past Surgical History:   Procedure Laterality Date   • HYSTERECTOMY     • REPLACEMENT TOTAL HIP LATERAL POSITION       General Information     Row Name 19 1013          PT Evaluation Time/Intention    Document Type  evaluation  -CM     Mode of Treatment  physical therapy  -CM     Row Name 19 1013          General Information    Patient Profile Reviewed?  yes 84 yo female adm 19 for onset of dizzness, vertigo, severe n/v. CT head (-). Pt denies symptoms of spinning sensation. States she only feels \"lightheaded\" with onset of symptoms. Reports no symptoms for past day.  -CM     Prior Level of Function  independent:;gait;all household mobility has rw and cane but does not normally use  -CM     Existing Precautions/Restrictions  fall  -CM     Barriers to Rehab  none identified  -CM     Row Name 19 1013          Relationship/Environment    Lives With  alone sister checks on her  -CM     Row Name 19 1013          Resource/Environmental Concerns    Current Living Arrangements  home/apartment/condo  -CM     Row Name 19 1013          Home Main Entrance    Number of Stairs, Main Entrance  three  -CM     Stair Railings, Main Entrance  railings safe and in good condition  -CM     Row Name 19 1013          Stairs Within Home, Primary    Number of Stairs, Within Home, Primary  none  -CM     Row Name 19 1013          Cognitive Assessment/Intervention- PT/OT "    Orientation Status (Cognition)  oriented x 4  -CM     Cognitive Assessment/Intervention Comment  slow mentation noted intermittently throughout exam; pleasant, cooperative  -CM     Row Name 12/23/19 1013          Safety Issues, Functional Mobility    Safety Issues Affecting Function (Mobility)  insight into deficits/self awareness;at risk behavior observed;ability to follow commands has difficulty following commands consistently for mm testing  -CM     Impairments Affecting Function (Mobility)  balance;coordination;strength  -CM       User Key  (r) = Recorded By, (t) = Taken By, (c) = Cosigned By    Initials Name Provider Type    Payal Sanchez, PT Physical Therapist        Mobility     Row Name 12/23/19 1016          Bed Mobility Assessment/Treatment    Bed Mobility Assessment/Treatment  bed mobility (all) activities;supine-sit  -CM     Supine-Sit [place] (Bed Mobility)  contact guard  -CM     Assistive Device (Bed Mobility)  bed rails  -CM     Comment (Bed Mobility)  no report of dizziness w/ change in position  -CM     Row Name 12/23/19 1016          Sit-Stand Transfer    Sit-Stand [place] (Transfers)  contact guard  -CM     Assistive Device (Sit-Stand Transfers)  walker, front-wheeled  -CM     Row Name 12/23/19 1016          Gait/Stairs Assessment/Training    Gait/Stairs Assessment/Training  gait/ambulation independence;gait/ambulation assistive device  -CM     [place] Level (Gait)  contact guard  -CM     Distance in Feet (Gait)  30 ft; limited distance markedly due to finding pt to be extremely hypertensive upon standing; would not have amb at all, but pt was needing to amb to bathroom  -CM     Deviations/Abnormal Patterns (Gait)  bilateral deviations;base of support, wide mild lateral sway, controlled w/ use of rw  -CM       User Key  (r) = Recorded By, (t) = Taken By, (c) = Cosigned By    Initials Name Provider Type    Payal Sanchez, PT Physical Therapist         Obj/Interventions     [facility] Name 12/23/19 1017          General ROM    GENERAL ROM COMMENTS  wfl all 4 extremities  -CM     Row Name 12/23/19 1017          MMT (Manual Muscle Testing)    General MMT Comments  R shoulder 3/5, R hip 3/5; all others 4-/5; pt unable to explain/answer if she had difficulty holding positions due to weakness or due to pain  -CM     Row Name 12/23/19 1017          Static Sitting Balance    Level of Harmony (Unsupported Sitting, Static Balance)  supervision  -CM     Sitting Position (Unsupported Sitting, Static Balance)  sitting on edge of bed  -CM     Row Name 12/23/19 1017          Dynamic Sitting Balance    Level of Harmony, Reaches Outside Midline (Sitting, Dynamic Balance)  supervision  -CM     Sitting Position, Reaches Outside Midline (Sitting, Dynamic Balance)  sitting on edge of bed  -CM     Row Name 12/23/19 1017          Static Standing Balance    Level of Harmony (Supported Standing, Static Balance)  contact guard assist  -CM     Assistive Device Utilized (Supported Standing, Static Balance)  walker, rolling  -CM     Row Name 12/23/19 1017          Dynamic Standing Balance    Level of Harmony, Reaches Outside Midline (Standing, Dynamic Balance)  contact guard assist  -CM     Assistive Device Utilized (Supported Standing, Dynamic Balance)  walker, rolling  -CM     Row Name 12/23/19 1017          Sensory Assessment/Intervention    Sensory General Assessment  no sensation deficits identified  -CM       User Key  (r) = Recorded By, (t) = Taken By, (c) = Cosigned By    Initials Name Provider Type    Payal Sanchez, PT Physical Therapist        Goals/Plan     Row Name 12/23/19 1023          Bed Mobility Goal 1 (PT)    Activity/Assistive Device (Bed Mobility Goal 1, PT)  bed mobility activities, all  -CM     Harmony Level/Cues Needed (Bed Mobility Goal 1, PT)  independent  -CM     Time Frame (Bed Mobility Goal 1, PT)  2 weeks  -CM     Row Name 12/23/19  1023          Transfer Goal 1 (PT)    Activity/Assistive Device (Transfer Goal 1, PT)  transfers, all;walker, rolling  -CM     [place] Level/Cues Needed (Transfer Goal 1, PT)  conditional independence  -CM     Time Frame (Transfer Goal 1, PT)  2 weeks  -CM     Barriers (Transfers Goal 1, PT)  no soa  -CM     Row Name 12/23/19 1023          Gait Training Goal 1 (PT)    Activity/Assistive Device (Gait Training Goal 1, PT)  gait (walking locomotion);walker, rolling  -CM     [place] Level (Gait Training Goal 1, PT)  conditional independence  -CM     Distance (Gait Goal 1, PT)  300 ft, no loss of balance  -CM     Time Frame (Gait Training Goal 1, PT)  2 weeks  -CM     Row Name 12/23/19 1023          ROM Goal 1 (PT)    Time Frame (ROM Goal 1, PT)  2 weeks  -CM     Row Name 12/23/19 1023          Patient Education Goal (PT)    Activity (Patient Education Goal, PT)  did not perform jose  hallpike test 2* lack of bppv symptoms & severe hypertension  -CM       User Key  (r) = Recorded By, (t) = Taken By, (c) = Cosigned By    Initials Name Provider Type    CM Payal Neves, PT Physical Therapist        Clinical Impression     Row Name 12/23/19 1019          Pain Assessment    Additional Documentation  Pain Scale: Numbers Pre/Post-Treatment (Group)  -CM     Row Name 12/23/19 1019          Pain Scale: Numbers Pre/Post-Treatment    Pain Scale: Numbers, Pretreatment  0/10 - no pain  -CM     Pain Scale: Numbers, Post-Treatment  0/10 - no pain  -CM     Pain Intervention(s)  Repositioned;Emotional support  -CM     Row Name 12/23/19 1026 12/23/19 1019       Plan of Care Review    Plan of Care Reviewed With  --  patient  -CM    Outcome Summary  84 yo female adm for acute dizziness, n/v. Shows no symptoms of bppv. Pt is able to amb well using a rw. Should be safe for home w/ home health at d/c. Pt experiencing severe htn during rx; RN notified immediately.  -CM  52 yo male adm for acute pancreatitis, elevated blood etoh  level of 0.388. Developed soa, now dx w/ pna. Presents w/ global weakness & is requiring 4LO2 to amb 40 ft. Pt is far below his normal level for mobility. Recommend IP rehab at d/c.   -    Row Name 12/23/19 1026 12/23/19 1019       Physical Therapy Clinical Impression    Patient/Family Goals Statement (PT Clinical Impression)  84 yo female adm for acute dizziness, n/v. Shows no symptoms of bppv. Pt is able to amb well using a rw. Should be safe for home w/ home health at d/c. Pt experiencing severe htn during rx; RN notified immediately.  -CM  52 yo male adm for acute pancreatitis, elevated blood etoh level of 0.388. Developed soa, now dx w/ pna. Presents w/ global weakness & is requiring 4LO2 to amb 40 ft. Pt is far below his normal level for mobility. Recommend IP rehab at d/c.   -    Criteria for Skilled Interventions Met (PT Clinical Impression)  --  yes;treatment indicated  -    Rehab Potential (PT Clinical Summary)  --  good, to achieve stated therapy goals  -CM    Predicted Duration of Therapy (PT)  --  until d/c  -    Row Name 12/23/19 1019          Vital Signs    Pre Systolic BP Rehab  181 sitting in chair  -CM     Pre Treatment Diastolic BP  73  -CM     Intra Systolic BP Rehab  212 standing  -CM     Intra Treatment Diastolic BP  71  -CM     Post Systolic BP Rehab  213 sitting after amb  -CM     Post Treatment Diastolic BP  70  -CM     Pretreatment Heart Rate (beats/min)  79  -CM     Intratreatment Heart Rate (beats/min)  79  -CM     Posttreatment Heart Rate (beats/min)  79  -CM     O2 Delivery Pre Treatment  room air  -CM     O2 Delivery Intra Treatment  room air  -CM     O2 Delivery Post Treatment  room air  -CM     Row Name 12/23/19 1019          Positioning and Restraints    Pre-Treatment Position  sitting in chair/recliner  -CM     Post Treatment Position  bathroom  -CM     Bathroom  notified nsg;sitting;encouraged to call for assist;call light within reach  -CM       User Key  (r) = Recorded  By, (t) = Taken By, (c) = Cosigned By    Initials Name Provider Type    Payal Sanchez PT Physical Therapist        Outcome Measures     Row Name 12/23/19 1024          How much help from another person do you currently need...    Turning from your back to your side while in flat bed without using bedrails?  4  -CM     Moving from lying on back to sitting on the side of a flat bed without bedrails?  4  -CM     Moving to and from a bed to a chair (including a wheelchair)?  4  -CM     Standing up from a chair using your arms (e.g., wheelchair, bedside chair)?  4  -CM     Climbing 3-5 steps with a railing?  3  -CM     To walk in hospital room?  3  -CM     AM-PAC 6 Clicks Score (PT)  22  -CM     Row Name 12/23/19 1024          Functional Assessment    Outcome Measure Options  AM-PAC 6 Clicks Basic Mobility (PT)  -CM       User Key  (r) = Recorded By, (t) = Taken By, (c) = Cosigned By    Initials Name Provider Type    Payal Sanchez PT Physical Therapist        Physical Therapy Education                 Title: PT OT SLP Therapies (Done)     Topic: Physical Therapy (Done)     Point: Mobility training (Done)     Description:   Instruct learner(s) on safety and technique for assisting patient out of bed, chair or wheelchair.  Instruct in the proper use of assistive devices, such as walker, crutches, cane or brace.              Patient Friendly Description:   It's important to get you on your feet again, but we need to do so in a way that is safe for you. Falling has serious consequences, and your personal safety is the most important thing of all.        When it's time to get out of bed, one of us or a family member will sit next to you on the bed to give you support.     If your doctor or nurse tells you to use a walker, crutches, a cane, or a brace, be sure you use it every time you get out of bed, even if you think you don't need it.    Learning Progress Summary           Patient Acceptance, E,TB, VU,NR by  HERMINIA at 12/23/2019 1025                   Point: Precautions (Done)     Description:   Instruct learner(s) on prescribed precautions during mobility and gait tasks              Learning Progress Summary           Patient Acceptance, E,TB, VU,NR by HERMINIA at 12/23/2019 1025                               User Key     Initials Effective Dates Name Provider Type Discipline     03/01/19 -  Payal Neves, PT Physical Therapist PT              PT Recommendation and Plan  Planned Therapy Interventions (PT Eval): balance training, bed mobility training, gait training, home exercise program, motor coordination training, patient/family education, postural re-education, strengthening, transfer training  Outcome Summary/Treatment Plan (PT)  Anticipated Discharge Disposition (PT): home with home health  Plan of Care Reviewed With: patient  Outcome Summary: 84 yo female adm for acute dizziness, n/v. Shows no symptoms of bppv. Pt is able to amb well using a rw. Should be safe for home w/ home health at d/c. Pt experiencing severe htn during rx; RN notified immediately.     Time Calculation:   PT Charges     Row Name 12/23/19 1028             Time Calculation    Start Time  0937  -CM      Stop Time  0954  -CM      Time Calculation (min)  17 min  -CM      PT Received On  12/23/19  -CM      PT - Next Appointment  12/25/19  -CM      PT Goal Re-Cert Due Date  01/06/20  -CM         Time Calculation- PT    Total Timed Code Minutes- PT  0 minute(s)  -CM        User Key  (r) = Recorded By, (t) = Taken By, (c) = Cosigned By    Initials Name Provider Type    Payal Sanchez, PT Physical Therapist        Therapy Charges for Today     Code Description Service Date Service Provider Modifiers Qty    86094137898 HC PT EVAL MOD COMPLEXITY 3 12/23/2019 Payal Neves, PT GP 1          PT G-Codes  Outcome Measure Options: AM-PAC 6 Clicks Basic Mobility (PT)  AM-PAC 6 Clicks Score (PT): 22    Payal Neves, PT  12/23/2019

## 2019-12-23 NOTE — PLAN OF CARE
Pt tolerating diet. No complaints of nausea this shift. NS with 20kcl/l continues to infuse at 100ml/hr. Continue plan of care.  Problem: Patient Care Overview  Goal: Plan of Care Review  Outcome: Ongoing (interventions implemented as appropriate)  Flowsheets (Taken 12/23/2019 0438)  Progress: improving  Plan of Care Reviewed With: patient  Goal: Individualization and Mutuality  Outcome: Ongoing (interventions implemented as appropriate)  Goal: Discharge Needs Assessment  Outcome: Ongoing (interventions implemented as appropriate)  Goal: Interprofessional Rounds/Family Conf  Outcome: Ongoing (interventions implemented as appropriate)     Problem: Fall Risk (Adult)  Goal: Identify Related Risk Factors and Signs and Symptoms  Outcome: Ongoing (interventions implemented as appropriate)  Goal: Absence of Fall  Outcome: Ongoing (interventions implemented as appropriate)

## 2019-12-23 NOTE — PROGRESS NOTES
Discharge Planning Assessment  [facility]     Patient Name: Madison Khan  MRN: [id number]  Today's Date: 12/23/2019    Admit Date: 12/21/2019    Discharge Needs Assessment     Row Name 12/23/19 1627       Living Environment    Lives With  alone    Unique Family Situation  Sister Georgia and  Sergio Toscano are at the bedside.    Current Living Arrangements  home/apartment/condo    Primary Care Provided by  self    Provides Primary Care For  no one    Family Caregiver if Needed  none    Able to Return to Prior Arrangements  yes       Resource/Environmental Concerns    Resource/Environmental Concerns  none       Transition Planning    Patient/Family Anticipates Transition to  home    Patient/Family Anticipated Services at Transition  none    Transportation Anticipated  family or friend will provide       Discharge Needs Assessment    Readmission Within the Last 30 Days  no previous admission in last 30 days    Concerns Comments  PT recommended home health.    Equipment Currently Used at Home  walker, rolling;cane, straight    Anticipated Changes Related to Illness  none    Equipment Needed After Discharge  none    Outpatient/Agency/Support Group Needs  homecare agency    Discharge Facility/Level of Care Needs  home with home health    Provided post acute provider list?  Refused    Patient's Choice of Community Agency(s)  [facility] - pt has used in the past.    Discharge Coordination/Progress  Home with [facility]        Discharge Plan     Row Name 12/23/19 3650       Plan    Plan  Home with [facility]              Home Medical Care      Service Provider Request Status Selected Services Address Phone Number Fax Number    [facility] Pending - Request Sent N/A [id number] [facility] [id number] [phone]              Expected Discharge Date and Time     Expected Discharge Date Expected Discharge Time    Dec 23, 2019         Demographic Summary     Row Name 12/23/19 1626        General Information    Admission Type  observation    Arrived From  emergency department    Required Notices Provided  Observation Status Notice    Referral Source  admission list    Reason for Consult  discharge planning    Preferred Language  English                   Shea Granda, RN

## 2019-12-23 NOTE — DISCHARGE SUMMARY
[facility]  DISCHARGE SUMMARY        Prepared For PCP:  Darryn Giron MD    Patient Name: Madison Khan  : 1934  MRN: [id number]      Date of Admission:   2019    Date of Discharge:  2019    Length of stay:  LOS: 0 days     Hospital Course     Presenting Problem:   Vertigo [R42]  Essential hypertension [I10]  Acute nonintractable headache, unspecified headache type [R51]  Non-intractable vomiting with nausea, unspecified vomiting type [R11.2]      Active Hospital Problems    Diagnosis  POA   • Essential hypertension [I10]  Yes      Resolved Hospital Problems    Diagnosis Date Resolved POA   • Vertigo [R42] 2019 Yes   • Acute nonintractable headache [R51] 2019 Yes   • Non-intractable vomiting [R11.10] 2019 Yes             Hospital Course:  The patient is an 85-year-old female who presents with a chief complaint of nausea and vomiting with dizziness.  The patient has not had any nausea or vomiting since admission, however she was just admitted yesterday evening.  The patient is currently on a clear liquid diet and seems to be tolerating that fairly well.  The patient reports that she is having issues being able to walk independently.  He reports she is very dependent upon nursing to assist her with ambulation.  Patient clinically improved.  Patient blood pressure was high which has improved.  Patient was seen by physical therapy and she was ambulating without any dizziness any problems.  Patient will be discharged to follow with PCP as an outpatient.  Patient will be discharged with home health.        Recommendation for Outpatient Providers:             Reasons For Change In Medications and Indications for New Medications:        Day of Discharge     HPI:       Vital Signs:   Temp:  [97.6 °F (36.4 °C)-98.4 °F (36.9 °C)] 97.6 °F (36.4 °C)  Heart Rate:  [68-75] 74  Resp:  [15-16] 15  BP: (125-189)/(55-79) 189/79     Physical  Exam:  Physical Exam   Cardiovascular: Normal rate and regular rhythm.   Pulmonary/Chest: Effort normal and breath sounds normal.   Abdominal: Soft. Bowel sounds are normal.   Nursing note and vitals reviewed.      Pertinent  and/or Most Recent Results     Results from last 7 days   Lab Units 12/23/19  0408 12/21/19  1316   WBC 10*3/mm3 6.60 9.40   HEMOGLOBIN g/dL 10.4* 11.8*   HEMATOCRIT % 30.7* 34.0   PLATELETS 10*3/mm3 243 277   SODIUM mmol/L 142 143   POTASSIUM mmol/L 4.4 4.3   CHLORIDE mmol/L 111* 104   CO2 mmol/L 25.0 29.0   BUN mg/dL 18 23   CREATININE mg/dL 0.97 1.01*   GLUCOSE mg/dL 87 110*   CALCIUM mg/dL 8.3* 10.7*           Invalid input(s): PROT, LABALBU        Invalid input(s): TG, LDLCALC, LDLREALC  Results from last 7 days   Lab Units 12/21/19  1316   TROPONIN T ng/mL <0.010       Brief Urine Lab Results     None          Microbiology Results Abnormal     None          Ct Head Without Contrast    Result Date: 12/21/2019  Impression: No evidence of hemorrhage, mass effect or midline shift. No acute process identified.  Electronically Signed ByAtiya Helton On:12/21/2019 2:42 PM This report was finalized on [id number] by  Kaylyn Helton, .    Xr Chest 1 View    Result Date: 12/21/2019  Impression: Cardiomegaly without acute cardiopulmonary abnormality.  Electronically Signed ByDane Paige On:12/21/2019 3:22 PM This report was finalized on [id number] by  Feng Paige, .                             Test Results Pending at Discharge        Procedures Performed           Consults:   Consults     Date and Time Order Name Status Description    12/21/2019 2035 Hospitalist (on-call MD unless specified) Completed             Discharge Details        Discharge Medications      Continue These Medications      Instructions Start Date   CALCIUM 500 PO   500 mg, Oral, Daily      furosemide 20 MG tablet  Commonly known as:  LASIX   20 mg, Oral, Daily      hydrALAZINE 25 MG tablet  Commonly known as:   APRESOLINE   25 mg, Oral, 2 Times Daily      meclizine 25 MG tablet  Commonly known as:  ANTIVERT   25 mg, Oral, 4 Times Daily PRN      multivitamin with minerals tablet tablet   1 tablet, Oral, Daily      vitamin B-12 1000 MCG tablet  Commonly known as:  CYANOCOBALAMIN   1,000 mcg, Oral, Daily             Allergies   Allergen Reactions   • Codeine Hives         Discharge Disposition:  Home-Health Care Svc    Diet:  Hospital:No active diet order        Discharge Activity:   Activity Instructions     As tolerated                 CODE STATUS:    Code Status and Medical Interventions:   Ordered at: 12/21/19 2149     Code Status:    CPR     Medical Interventions (Level of Support Prior to Arrest):    Full         Follow-up Appointments  No future appointments.    Additional Instructions for the Follow-ups that You Need to Schedule     Ambulatory Referral to Home Health   As directed      Face to Face Visit Date:  12/23/2019    Follow-up provider for Plan of Care?:  I treated the patient in an acute care facility and will not continue treatment after discharge.    Follow-up provider:  CARTER KAYE [[id number]]    Reason/Clinical Findings:  hypertension, nausea/vomitting    Describe mobility limitations that make leaving home difficult:  weakness    Nursing/Therapeutic Services Requested:  Other (eval and treat)    Frequency:  1 Week 1                 Condition on Discharge:      Stable          Electronically signed by Hipolito Damian MD, 12/23/19, 5:27 PM.    Time: I spent  32  minutes on this discharge activity which included face-to-face encounter with the patient/reviewing the data in the system/coordination of the care with the nursing staff as well as consultants/documentation/entering orders.

## 2019-12-23 NOTE — PLAN OF CARE
Problem: Patient Care Overview  Goal: Plan of Care Review  12/23/2019 1027 by Payal Neves, PT  Outcome: Ongoing (interventions implemented as appropriate)  Flowsheets  Taken 12/23/2019 1019  Plan of Care Reviewed With: patient  Taken 12/23/2019 1026  Outcome Summary: 84 yo female adm for acute dizziness, n/v. Shows no symptoms of bppv. Pt is able to amb well using a rw. Should be safe for home w/ home health at d/c. Pt experiencing severe htn during rx; RN notified immediately.

## 2019-12-24 ENCOUNTER — READMISSION MANAGEMENT (OUTPATIENT)
Dept: CALL CENTER | Facility: HOSPITAL | Age: 84
End: 2019-12-24

## 2019-12-24 NOTE — PROGRESS NOTES
Case Management Discharge Note      Final Note: home w/[facility]    Provided post acute provider list?: Refused                 Final Discharge Disposition Code: 06 - home with home health care

## 2019-12-24 NOTE — OUTREACH NOTE
Prep Survey      Responses   Facility patient discharged from?  [facility]   Is patient eligible?  Yes   Discharge diagnosis  vertigo, HTN, acute HA, n/v   Does the patient have one of the following disease processes/diagnoses(primary or secondary)?  Other   Does the patient have Home health ordered?  Yes   What is the Home health agency?   [facility]   Is there a DME ordered?  No   Prep survey completed?  Yes          Jeanne Guajardo RN

## 2019-12-26 ENCOUNTER — READMISSION MANAGEMENT (OUTPATIENT)
Dept: CALL CENTER | Facility: HOSPITAL | Age: 84
End: 2019-12-26

## 2019-12-27 NOTE — OUTREACH NOTE
Medical Week 1 Survey      Responses   Facility patient discharged from?  [facility]   Does the patient have one of the following disease processes/diagnoses(primary or secondary)?  Other   Is there a successful TCM telephone encounter documented?  No   Week 1 attempt successful?  Yes   Call start time  1938   Call end time  1940   Discharge diagnosis  vertigo, HTN, acute HA, n/v   Does the patient have all medications ordered at discharge?  N/A   Is the patient taking all medications as directed (includes completed medication regime)?  Yes   Does the patient have a primary care provider?   Yes   Does the patient have an appointment with their PCP within 7 days of discharge?  No   Comments regarding PCP  Dr. Zelaya   What is preventing the patient from scheduling follow up appointments within 7 days of discharge?  Haven't had time   Nursing Interventions  Educated patient on importance of making appointment, Advised patient to make appointment   Has the patient kept scheduled appointments due by today?  N/A   Comments  Pt states she will call tomorrow and get an appt   What is the Home health agency?   [facility]    Has home health visited the patient within 72 hours of discharge?  Yes   Did the patient receive a copy of their discharge instructions?  Yes   Nursing interventions  Reviewed instructions with patient   What is the patient's perception of their health status since discharge?  Improving   Is the patient/caregiver able to teach back the hierarchy of who to call/visit for symptoms/problems? PCP, Specialist, Home health nurse, Urgent Care, ED, 911  Yes   Additional teach back comments  Patient states home health was in at 4:00 today.  She is doing well and will call and get an appt with Dr. Zelaya   Week 1 call completed?  Yes   Graduated  Yes   Did the patient feel the follow up calls were helpful during their recovery period?  Yes   Was the number of calls appropriate?  Yes   Graduated/Revoked comments  No questions  or needs at this time          Nikki Milton, DELONN

## 2021-06-05 ENCOUNTER — APPOINTMENT (OUTPATIENT)
Dept: CT IMAGING | Facility: HOSPITAL | Age: 86
End: 2021-06-05

## 2021-06-05 ENCOUNTER — HOSPITAL ENCOUNTER (EMERGENCY)
Facility: HOSPITAL | Age: 86
Discharge: HOME | End: 2021-06-05
Attending: EMERGENCY MEDICINE | Admitting: EMERGENCY MEDICINE

## 2021-06-05 VITALS
HEART RATE: 84 BPM | HEIGHT: 62 IN | OXYGEN SATURATION: 100 % | RESPIRATION RATE: 16 BRPM | SYSTOLIC BLOOD PRESSURE: 175 MMHG | BODY MASS INDEX: 29.37 KG/M2 | DIASTOLIC BLOOD PRESSURE: 58 MMHG | TEMPERATURE: 98.2 F | WEIGHT: 159.61 LBS

## 2021-06-05 DIAGNOSIS — R42 DIZZINESS: ICD-10-CM

## 2021-06-05 DIAGNOSIS — I10 UNCONTROLLED HYPERTENSION: Primary | ICD-10-CM

## 2021-06-05 LAB
ANION GAP SERPL CALCULATED.3IONS-SCNC: 9 MMOL/L (ref 5–15)
BASOPHILS # BLD AUTO: 0.1 10*3/MM3 (ref 0–0.2)
BASOPHILS NFR BLD AUTO: 1.2 % (ref 0–1.5)
BUN SERPL-MCNC: 20 MG/DL (ref 8–23)
BUN/CREAT SERPL: 16.8 (ref 7–25)
CALCIUM SPEC-SCNC: 9.4 MG/DL (ref 8.6–10.5)
CHLORIDE SERPL-SCNC: 104 MMOL/L (ref 98–107)
CO2 SERPL-SCNC: 31 MMOL/L (ref 22–29)
CREAT SERPL-MCNC: 1.19 MG/DL (ref 0.57–1)
DEPRECATED RDW RBC AUTO: 41.1 FL (ref 37–54)
EOSINOPHIL # BLD AUTO: 0.1 10*3/MM3 (ref 0–0.4)
EOSINOPHIL NFR BLD AUTO: 1.1 % (ref 0.3–6.2)
ERYTHROCYTE [DISTWIDTH] IN BLOOD BY AUTOMATED COUNT: 13.6 % (ref 12.3–15.4)
GFR SERPL CREATININE-BSD FRML MDRD: 43 ML/MIN/1.73
GLUCOSE SERPL-MCNC: 117 MG/DL (ref 65–99)
HCT VFR BLD AUTO: 34.3 % (ref 34–46.6)
HGB BLD-MCNC: 12 G/DL (ref 12–15.9)
LYMPHOCYTES # BLD AUTO: 1.6 10*3/MM3 (ref 0.7–3.1)
LYMPHOCYTES NFR BLD AUTO: 19.4 % (ref 19.6–45.3)
MCH RBC QN AUTO: 30.4 PG (ref 26.6–33)
MCHC RBC AUTO-ENTMCNC: 35 G/DL (ref 31.5–35.7)
MCV RBC AUTO: 86.9 FL (ref 79–97)
MONOCYTES # BLD AUTO: 0.6 10*3/MM3 (ref 0.1–0.9)
MONOCYTES NFR BLD AUTO: 7.3 % (ref 5–12)
NEUTROPHILS NFR BLD AUTO: 5.7 10*3/MM3 (ref 1.7–7)
NEUTROPHILS NFR BLD AUTO: 71 % (ref 42.7–76)
NRBC BLD AUTO-RTO: 0 /100 WBC (ref 0–0.2)
PLATELET # BLD AUTO: 294 10*3/MM3 (ref 140–450)
PMV BLD AUTO: 8 FL (ref 6–12)
POTASSIUM SERPL-SCNC: 3.8 MMOL/L (ref 3.5–5.2)
RBC # BLD AUTO: 3.94 10*6/MM3 (ref 3.77–5.28)
SODIUM SERPL-SCNC: 144 MMOL/L (ref 136–145)
TROPONIN T SERPL-MCNC: 0.01 NG/ML (ref 0–0.03)
TSH SERPL DL<=0.05 MIU/L-ACNC: 3.31 UIU/ML (ref 0.27–4.2)
WBC # BLD AUTO: 8.1 10*3/MM3 (ref 3.4–10.8)

## 2021-06-05 PROCEDURE — 80048 BASIC METABOLIC PNL TOTAL CA: CPT | Performed by: EMERGENCY MEDICINE

## 2021-06-05 PROCEDURE — 70450 CT HEAD/BRAIN W/O DYE: CPT

## 2021-06-05 PROCEDURE — 99283 EMERGENCY DEPT VISIT LOW MDM: CPT

## 2021-06-05 PROCEDURE — 93005 ELECTROCARDIOGRAM TRACING: CPT | Performed by: EMERGENCY MEDICINE

## 2021-06-05 PROCEDURE — 84484 ASSAY OF TROPONIN QUANT: CPT | Performed by: EMERGENCY MEDICINE

## 2021-06-05 PROCEDURE — 84443 ASSAY THYROID STIM HORMONE: CPT | Performed by: EMERGENCY MEDICINE

## 2021-06-05 PROCEDURE — 85025 COMPLETE CBC W/AUTO DIFF WBC: CPT | Performed by: EMERGENCY MEDICINE

## 2021-06-05 PROCEDURE — 96374 THER/PROPH/DIAG INJ IV PUSH: CPT

## 2021-06-05 RX ORDER — SODIUM CHLORIDE 0.9 % (FLUSH) 0.9 %
10 SYRINGE (ML) INJECTION AS NEEDED
Status: DISCONTINUED | OUTPATIENT
Start: 2021-06-05 | End: 2021-06-05 | Stop reason: HOSPADM

## 2021-06-05 RX ORDER — LABETALOL HYDROCHLORIDE 5 MG/ML
10 INJECTION, SOLUTION INTRAVENOUS ONCE
Status: COMPLETED | OUTPATIENT
Start: 2021-06-05 | End: 2021-06-05

## 2021-06-05 RX ORDER — AMLODIPINE BESYLATE 5 MG/1
5 TABLET ORAL DAILY
Qty: 15 TABLET | Refills: 0 | Status: SHIPPED | OUTPATIENT
Start: 2021-06-05

## 2021-06-05 RX ADMIN — LABETALOL 20 MG/4 ML (5 MG/ML) INTRAVENOUS SYRINGE 10 MG: at 13:09

## 2021-06-05 NOTE — DISCHARGE INSTRUCTIONS
Continue current blood pressure treatment and start Norvasc.  Return for increased pain, increased dizziness, shortness of breath or any other concerns.  Follow-up with your doctor this week for further hypertension management

## 2021-06-05 NOTE — ED PROVIDER NOTES
"Subjective   History of Present Illness  Headache, dizziness, elevated blood pressure  86-year-old female states she has had some intermittent episodes of symptomatic elevated blood pressure over the last few weeks.  She states last night she had some \"slight \"headache and dizziness and she took her blood pressure and it was around 200.  She reports no chest pain or blurry vision or focal numbness or weakness or speech difficulties.  She states she last took her blood pressure medicine at 6 AM this morning  Review of Systems   Constitutional: Negative.    Eyes: Negative.    Respiratory: Negative.    Cardiovascular: Negative.    Gastrointestinal: Negative.    Genitourinary: Negative.    Musculoskeletal: Negative.    Skin: Negative.    Neurological: Positive for dizziness and headaches.   Psychiatric/Behavioral: Negative.        Past Medical History:   Diagnosis Date   • Hypertension    Chronic arthritis    Allergies   Allergen Reactions   • Codeine Hives       Past Surgical History:   Procedure Laterality Date   • HYSTERECTOMY     • REPLACEMENT TOTAL HIP LATERAL POSITION         No family history on file.    Social History     Socioeconomic History   • Marital status:      Spouse name: Not on file   • Number of children: Not on file   • Years of education: Not on file   • Highest education level: Not on file   Tobacco Use   • Smoking status: Never Smoker   • Smokeless tobacco: Never Used   Substance and Sexual Activity   • Alcohol use: Never   • Drug use: Never       Prior to Admission medications    Medication Sig Start Date End Date Taking? Authorizing Provider   Calcium-Magnesium-Vitamin D (CALCIUM 500 PO) Take 500 mg by mouth Daily.    Ryan Delatorre MD   furosemide (LASIX) 20 MG tablet Take 20 mg by mouth Daily.    Ryan Delatorre MD   hydrALAZINE (APRESOLINE) 25 MG tablet Take 25 mg by mouth 2 (Two) Times a Day.    Ryan Delatorre MD   Multiple Vitamins-Minerals (MULTIVITAMIN WITH " "MINERALS) tablet tablet Take 1 tablet by mouth Daily.    Provider, MD Ryan   vitamin B-12 (CYANOCOBALAMIN) 1000 MCG tablet Take 1,000 mcg by mouth Daily.    Provider, MD Ryan     /54   Pulse 64   Temp 98.1 °F (36.7 °C) (Oral)   Resp 15   Ht 157.5 cm (62\")   Wt 72.4 kg (159 lb 9.8 oz)   SpO2 98%   Breastfeeding No   BMI 29.19 kg/m²   I examined the patient using the appropriate personal protective equipment.        Objective   Physical Exam  General: Well-developed elderly female no acute distress, awake and alert and pleasant, hard of hearing  Eyes: Pupils round and equal, sclera nonicteric  HEENT: Mucous membranes moist, no mucosal swelling  Neck: Supple, no nuchal rigidity, no lymphadenopathy  Respirations: Respirations nonlabored, equal breath sounds bilaterally, clear lungs  Heart regular rate and rhythm, no murmurs rubs or gallops,   Abdomen soft nontender nondistended, no hepatosplenomegaly,  Extremities no clubbing cyanosis or edema, calves are symmetric and nontender  Neuro cranial nerves II through XII intact , normal sensory/motor function and strength in four extremities, no slurred speech, no facial droop, normal finger to nose,  no nuchal rigidity  Psych oriented, pleasant affect  Skin no rash, brisk cap refill  Procedures           ED Course      My EKG interpretation sinus rhythm, left bundle branch block, rate of 62           Results for orders placed or performed during the hospital encounter of 06/05/21   Basic Metabolic Panel    Specimen: Blood   Result Value Ref Range    Glucose 117 (H) 65 - 99 mg/dL    BUN 20 8 - 23 mg/dL    Creatinine 1.19 (H) 0.57 - 1.00 mg/dL    Sodium 144 136 - 145 mmol/L    Potassium 3.8 3.5 - 5.2 mmol/L    Chloride 104 98 - 107 mmol/L    CO2 31.0 (H) 22.0 - 29.0 mmol/L    Calcium 9.4 8.6 - 10.5 mg/dL    eGFR Non African Amer 43 (L) >60 mL/min/1.73    BUN/Creatinine Ratio 16.8 7.0 - 25.0    Anion Gap 9.0 5.0 - 15.0 mmol/L   Troponin    Specimen: " Blood   Result Value Ref Range    Troponin T 0.013 0.000 - 0.030 ng/mL   TSH    Specimen: Blood   Result Value Ref Range    TSH 3.310 0.270 - 4.200 uIU/mL   CBC Auto Differential    Specimen: Blood   Result Value Ref Range    WBC 8.10 3.40 - 10.80 10*3/mm3    RBC 3.94 3.77 - 5.28 10*6/mm3    Hemoglobin 12.0 12.0 - 15.9 g/dL    Hematocrit 34.3 34.0 - 46.6 %    MCV 86.9 79.0 - 97.0 fL    MCH 30.4 26.6 - 33.0 pg    MCHC 35.0 31.5 - 35.7 g/dL    RDW 13.6 12.3 - 15.4 %    RDW-SD 41.1 37.0 - 54.0 fl    MPV 8.0 6.0 - 12.0 fL    Platelets 294 140 - 450 10*3/mm3    Neutrophil % 71.0 42.7 - 76.0 %    Lymphocyte % 19.4 (L) 19.6 - 45.3 %    Monocyte % 7.3 5.0 - 12.0 %    Eosinophil % 1.1 0.3 - 6.2 %    Basophil % 1.2 0.0 - 1.5 %    Neutrophils, Absolute 5.70 1.70 - 7.00 10*3/mm3    Lymphocytes, Absolute 1.60 0.70 - 3.10 10*3/mm3    Monocytes, Absolute 0.60 0.10 - 0.90 10*3/mm3    Eosinophils, Absolute 0.10 0.00 - 0.40 10*3/mm3    Basophils, Absolute 0.10 0.00 - 0.20 10*3/mm3    nRBC 0.0 0.0 - 0.2 /100 WBC   ECG 12 Lead   Result Value Ref Range    QT Interval 452 ms     CT Head Without Contrast    Result Date: 6/5/2021  No evidence of hemorrhage, mass effect or midline shift. No acute process identified.  Electronically Signed By-Kaylyn Helton MD On:6/5/2021 1:34 PM This report was finalized on [id number] by  Kaylyn Helton MD.                                 MDM  Patient presented with some uncontrolled hypertension that has become symptomatic with describing some occasional mild dizziness and headache.  Notably she has normal neurologic exam currently blood pressure was elevated initially it was improving after being given a dose of labetalol.  She is currently asymptomatic.  Her head CT shows no acute abnormality EKG shows no acute ischemia.  She is prescribed Norvasc and she is to follow-up with her doctor this week for further hypertension management and was given warning signs for return.  Patient and daughter agreeable  to the plan.  Final diagnoses:   Uncontrolled hypertension   Dizziness       ED Disposition  ED Disposition     ED Disposition Condition Comment    Discharge Stable           No follow-up provider specified.       Medication List      New Prescriptions    amLODIPine 5 MG tablet  Commonly known as: NORVASC  Take 1 tablet by mouth Daily.           Where to Get Your Medications      These medications were sent to Health system - [place], [place] - [address] [facility] - [phone]  - [phone] FX  [address] [place] [address] [place] [address]    Phone: [phone]   · amLODIPine 5 MG tablet          Candido Haywood MD  06/05/21 5392

## 2021-06-06 LAB — QT INTERVAL: 452 MS

## 2023-03-21 NOTE — PLAN OF CARE
Problem: Patient Care Overview  Goal: Plan of Care Review  Outcome: Ongoing (interventions implemented as appropriate)  Flowsheets (Taken 12/22/2019 1640)  Outcome Summary: patient resting in bed throughout day. was asking about home medications and taking them in the hospital. no complaints during shift. will continue to monitor.      Lactic acid 10- reflex 7.8   - repeat pending   - hydrate - BC x 2 , chest xray, urine culture   - start rocephin and follow cultures